# Patient Record
Sex: FEMALE | Race: WHITE | NOT HISPANIC OR LATINO
[De-identification: names, ages, dates, MRNs, and addresses within clinical notes are randomized per-mention and may not be internally consistent; named-entity substitution may affect disease eponyms.]

---

## 2017-04-17 ENCOUNTER — HOSPITAL (OUTPATIENT)
Dept: OTHER | Age: 67
End: 2017-04-17

## 2017-04-25 ENCOUNTER — CHARTING TRANS (OUTPATIENT)
Dept: OTHER | Age: 67
End: 2017-04-25

## 2017-05-01 ENCOUNTER — HOSPITAL (OUTPATIENT)
Dept: OTHER | Age: 67
End: 2017-05-01
Attending: OPHTHALMOLOGY

## 2017-07-10 ENCOUNTER — HOSPITAL (OUTPATIENT)
Dept: OTHER | Age: 67
End: 2017-07-10
Attending: OPHTHALMOLOGY

## 2018-04-12 ENCOUNTER — HOSPITAL (OUTPATIENT)
Dept: OTHER | Age: 68
End: 2018-04-12
Attending: ORTHOPAEDIC SURGERY

## 2018-06-20 PROBLEM — J30.9 ALLERGIC RHINITIS: Status: ACTIVE | Noted: 2018-06-20

## 2018-06-20 PROBLEM — T84.012S FAILED TOTAL KNEE, RIGHT, SEQUELA: Status: ACTIVE | Noted: 2018-06-20

## 2018-06-20 PROBLEM — I10 ESSENTIAL HYPERTENSION: Status: ACTIVE | Noted: 2018-06-20

## 2018-06-20 PROBLEM — E78.2 MIXED HYPERLIPIDEMIA: Status: ACTIVE | Noted: 2018-06-20

## 2018-09-17 ENCOUNTER — HOSPITAL (OUTPATIENT)
Dept: OTHER | Age: 68
End: 2018-09-17
Attending: ORTHOPAEDIC SURGERY

## 2018-09-17 LAB
ANION GAP SERPL CALC-SCNC: 12 MEQ/L (ref 10–20)
BUN SERPL-MCNC: 12 MG/DL (ref 6–20)
CALCIUM: 10 MG/DL (ref 8.4–10.2)
CHLORIDE: 98 MEQ/L (ref 97–107)
CREATININE: 0.67 MG/DL (ref 0.6–1.3)
GLUCOSE LVL: 105 MG/DL (ref 70–99)
HEMOLYSIS 2+: NEGATIVE
POTASSIUM: 4.1 MEQ/L (ref 3.5–5.1)
SODIUM: 134 MEQ/L (ref 136–145)
TCO2: 28 MEQ/L (ref 19–29)

## 2018-11-03 VITALS
HEART RATE: 85 BPM | SYSTOLIC BLOOD PRESSURE: 133 MMHG | HEIGHT: 64 IN | BODY MASS INDEX: 30.73 KG/M2 | WEIGHT: 179.99 LBS | OXYGEN SATURATION: 97 % | DIASTOLIC BLOOD PRESSURE: 68 MMHG

## 2019-03-22 ENCOUNTER — TELEPHONE (OUTPATIENT)
Dept: SURGERY | Age: 69
End: 2019-03-22

## 2019-03-22 DIAGNOSIS — D05.11 DUCTAL CARCINOMA IN SITU (DCIS) OF RIGHT BREAST: Primary | ICD-10-CM

## 2019-03-28 ENCOUNTER — HOSPITAL (OUTPATIENT)
Dept: OTHER | Age: 69
End: 2019-03-28
Attending: SURGERY

## 2019-04-23 DIAGNOSIS — D05.11 DUCTAL CARCINOMA IN SITU (DCIS) OF RIGHT BREAST: ICD-10-CM

## 2019-04-24 ENCOUNTER — OFFICE VISIT (OUTPATIENT)
Dept: SURGERY | Age: 69
End: 2019-04-24

## 2019-04-24 VITALS
BODY MASS INDEX: 31.65 KG/M2 | DIASTOLIC BLOOD PRESSURE: 80 MMHG | SYSTOLIC BLOOD PRESSURE: 167 MMHG | HEART RATE: 83 BPM | HEIGHT: 62 IN | WEIGHT: 172 LBS

## 2019-04-24 DIAGNOSIS — D05.11 DUCTAL CARCINOMA IN SITU (DCIS) OF RIGHT BREAST: Primary | ICD-10-CM

## 2019-04-24 PROCEDURE — 99203 OFFICE O/P NEW LOW 30 MIN: CPT | Performed by: SURGERY

## 2019-04-24 RX ORDER — MULTIVITAMIN
TABLET ORAL
COMMUNITY

## 2019-04-24 RX ORDER — LORATADINE 10 MG/1
10 TABLET ORAL DAILY
COMMUNITY

## 2019-04-24 RX ORDER — VALSARTAN 80 MG/1
TABLET ORAL
COMMUNITY

## 2019-04-24 RX ORDER — OMEGA-3 FATTY ACIDS/FISH OIL 300-1000MG
CAPSULE ORAL
COMMUNITY

## 2019-04-24 RX ORDER — ESOMEPRAZOLE MAGNESIUM 40 MG/1
CAPSULE, DELAYED RELEASE ORAL
COMMUNITY
Start: 2016-05-05

## 2019-04-24 RX ORDER — OMEGA-3/DHA/EPA/FISH OIL 60 MG-90MG
1 CAPSULE ORAL DAILY
COMMUNITY

## 2019-04-24 RX ORDER — RIBOFLAVIN (VITAMIN B2) 100 MG
TABLET ORAL
COMMUNITY

## 2019-04-24 RX ORDER — MULTIVIT-MIN/IRON/FOLIC ACID/K 18-600-40
CAPSULE ORAL
COMMUNITY

## 2019-04-24 RX ORDER — EZETIMIBE 10 MG/1
TABLET ORAL
COMMUNITY
Start: 2016-01-03

## 2019-04-24 SDOH — HEALTH STABILITY: MENTAL HEALTH: HOW OFTEN DO YOU HAVE A DRINK CONTAINING ALCOHOL?: NEVER

## 2019-10-30 ENCOUNTER — HOSPITAL (OUTPATIENT)
Dept: OTHER | Age: 69
End: 2019-10-30
Attending: INTERNAL MEDICINE

## 2019-11-02 ENCOUNTER — HOSPITAL (OUTPATIENT)
Dept: OTHER | Age: 69
End: 2019-11-02
Attending: INTERNAL MEDICINE

## 2019-11-02 LAB
CONFIRM: NORMAL
MALAR REVIEW BY: NORMAL
MALARIA PERCENT SEEN: 0 %
MALARIA SMEAR: NORMAL

## 2019-11-09 ENCOUNTER — HOSPITAL (OUTPATIENT)
Dept: OTHER | Age: 69
End: 2019-11-09
Attending: INTERNAL MEDICINE

## 2019-11-26 RX ORDER — VALSARTAN AND HYDROCHLOROTHIAZIDE 160; 12.5 MG/1; MG/1
1 TABLET, FILM COATED ORAL DAILY
COMMUNITY

## 2019-11-26 RX ORDER — EZETIMIBE 10 MG/1
10 TABLET ORAL NIGHTLY
COMMUNITY

## 2019-11-26 RX ORDER — LORATADINE 10 MG/1
10 TABLET ORAL AS NEEDED
COMMUNITY

## 2019-11-26 RX ORDER — ACETAMINOPHEN 325 MG/1
500 TABLET ORAL EVERY 6 HOURS PRN
Status: ON HOLD | COMMUNITY
End: 2019-12-17

## 2019-11-26 RX ORDER — OMEGA-3 FATTY ACIDS/FISH OIL 300-1000MG
400 CAPSULE ORAL DAILY
Status: ON HOLD | COMMUNITY
End: 2019-12-17

## 2019-11-27 ENCOUNTER — OFFICE VISIT (OUTPATIENT)
Dept: FAMILY MEDICINE CLINIC | Facility: CLINIC | Age: 69
End: 2019-11-27
Payer: COMMERCIAL

## 2019-11-27 ENCOUNTER — HOSPITAL ENCOUNTER (OUTPATIENT)
Dept: GENERAL RADIOLOGY | Facility: HOSPITAL | Age: 69
Discharge: HOME | End: 2019-11-27
Attending: FAMILY MEDICINE
Payer: MEDICARE

## 2019-11-27 ENCOUNTER — LAB ENCOUNTER (OUTPATIENT)
Dept: LAB | Facility: HOSPITAL | Age: 69
End: 2019-11-27
Attending: FAMILY MEDICINE
Payer: MEDICARE

## 2019-11-27 ENCOUNTER — TELEPHONE (OUTPATIENT)
Dept: FAMILY MEDICINE CLINIC | Facility: CLINIC | Age: 69
End: 2019-11-27

## 2019-11-27 VITALS
DIASTOLIC BLOOD PRESSURE: 70 MMHG | SYSTOLIC BLOOD PRESSURE: 132 MMHG | RESPIRATION RATE: 16 BRPM | WEIGHT: 180 LBS | BODY MASS INDEX: 33.13 KG/M2 | HEIGHT: 62 IN | HEART RATE: 88 BPM | TEMPERATURE: 98 F | OXYGEN SATURATION: 98 %

## 2019-11-27 DIAGNOSIS — E78.2 MIXED HYPERLIPIDEMIA: ICD-10-CM

## 2019-11-27 DIAGNOSIS — J30.9 ALLERGIC RHINITIS, UNSPECIFIED SEASONALITY, UNSPECIFIED TRIGGER: ICD-10-CM

## 2019-11-27 DIAGNOSIS — I10 ESSENTIAL HYPERTENSION: ICD-10-CM

## 2019-11-27 DIAGNOSIS — Z01.812 PRE-OPERATIVE LABORATORY EXAMINATION: ICD-10-CM

## 2019-11-27 DIAGNOSIS — T84.093D FAILED TOTAL LEFT KNEE REPLACEMENT, SUBSEQUENT ENCOUNTER: ICD-10-CM

## 2019-11-27 DIAGNOSIS — Z01.818 OTHER SPECIFIED PRE-OPERATIVE EXAMINATION: ICD-10-CM

## 2019-11-27 DIAGNOSIS — R70.0 ELEVATED SED RATE: ICD-10-CM

## 2019-11-27 DIAGNOSIS — Z01.818 OTHER SPECIFIED PRE-OPERATIVE EXAMINATION: Primary | ICD-10-CM

## 2019-11-27 DIAGNOSIS — K21.00 GASTROESOPHAGEAL REFLUX DISEASE WITH ESOPHAGITIS: ICD-10-CM

## 2019-11-27 DIAGNOSIS — R50.9 FEBRILE ILLNESS: ICD-10-CM

## 2019-11-27 PROBLEM — T84.093A FAILED TOTAL KNEE, LEFT: Status: ACTIVE | Noted: 2018-06-20

## 2019-11-27 PROBLEM — T84.093A FAILED TOTAL KNEE, LEFT (HCC): Status: ACTIVE | Noted: 2018-06-20

## 2019-11-27 PROCEDURE — 80053 COMPREHEN METABOLIC PANEL: CPT

## 2019-11-27 PROCEDURE — 93010 ELECTROCARDIOGRAM REPORT: CPT | Performed by: FAMILY MEDICINE

## 2019-11-27 PROCEDURE — 87081 CULTURE SCREEN ONLY: CPT

## 2019-11-27 PROCEDURE — 86850 RBC ANTIBODY SCREEN: CPT

## 2019-11-27 PROCEDURE — 85025 COMPLETE CBC W/AUTO DIFF WBC: CPT

## 2019-11-27 PROCEDURE — 36415 COLL VENOUS BLD VENIPUNCTURE: CPT

## 2019-11-27 PROCEDURE — 86900 BLOOD TYPING SEROLOGIC ABO: CPT

## 2019-11-27 PROCEDURE — 93005 ELECTROCARDIOGRAM TRACING: CPT

## 2019-11-27 PROCEDURE — 81015 MICROSCOPIC EXAM OF URINE: CPT

## 2019-11-27 PROCEDURE — 85652 RBC SED RATE AUTOMATED: CPT

## 2019-11-27 PROCEDURE — 86431 RHEUMATOID FACTOR QUANT: CPT | Performed by: FAMILY MEDICINE

## 2019-11-27 PROCEDURE — 71046 X-RAY EXAM CHEST 2 VIEWS: CPT | Performed by: FAMILY MEDICINE

## 2019-11-27 PROCEDURE — 86038 ANTINUCLEAR ANTIBODIES: CPT | Performed by: FAMILY MEDICINE

## 2019-11-27 PROCEDURE — 86140 C-REACTIVE PROTEIN: CPT

## 2019-11-27 PROCEDURE — 86901 BLOOD TYPING SEROLOGIC RH(D): CPT

## 2019-11-27 PROCEDURE — 99215 OFFICE O/P EST HI 40 MIN: CPT | Performed by: FAMILY MEDICINE

## 2019-11-27 NOTE — H&P (VIEW-ONLY)
[facility] PRE-OP CLINIC DEVISONALI    PRE-OP NOTE    HPI:   I have been consulted by Dr. Aj Stephens to see Tamiko Houstonhry 81year old female for a preoperative evaluation and medical clearance. Kaylen Santiago has a long history of worsening pain of her left knee.  Sh Date   • Back problem     sciatica   • Esophageal reflux    • Essential hypertension    • High blood pressure    • High cholesterol    • Hyperlipidemia    • Osteoarthritis    • Visual impairment     reading glasses     Past Surgical History:   Procedure La pain, visual loss, blurred vision, double vision. Ears, Nose, Throat:  Denies congestion, runny nose or sore throat. INTEGUMENTARY:  Denies rashes, itching, skin lesion,   CARDIOVASCULAR:  Denies DVT.  Denies chest pain, palpitations, edema, dyspnea  RESPI hepatosplenomegaly.   BACK: No tenderness, no spasm,   EXTREMITIES:  No edema, no cyanosis, no clubbing,   NEURO:  No focal deficit, speech fluent, normal gait, strength and tone, sensory intact      Lab Results   Component Value Date    WBC 10.0 11/27/2019 No  Peter has an RCRI score that is low risk and is at 0.9% risk for major cardiac event in the perioperative period.      Pt's labs with elevated sed rate and c-rp to be reviewed by Dr. Madi Groves team. Pt otherwise has an acceptable risk of surgery and ma

## 2019-11-27 NOTE — H&P
[facility] PRE-OP CLINIC DEVISONALI    PRE-OP NOTE    HPI:   I have been consulted by Dr. Clarissa Holguin to see Felicia Bo 81year old female for a preoperative evaluation and medical clearance. Nicky Longoria has a long history of worsening pain of her left knee.  Sh Date   • Back problem     sciatica   • Esophageal reflux    • Essential hypertension    • High blood pressure    • High cholesterol    • Hyperlipidemia    • Osteoarthritis    • Visual impairment     reading glasses     Past Surgical History:   Procedure La pain, visual loss, blurred vision, double vision. Ears, Nose, Throat:  Denies congestion, runny nose or sore throat. INTEGUMENTARY:  Denies rashes, itching, skin lesion,   CARDIOVASCULAR:  Denies DVT.  Denies chest pain, palpitations, edema, dyspnea  RESPI hepatosplenomegaly.   BACK: No tenderness, no spasm,   EXTREMITIES:  No edema, no cyanosis, no clubbing,   NEURO:  No focal deficit, speech fluent, normal gait, strength and tone, sensory intact      Lab Results   Component Value Date    WBC 10.0 11/27/2019 No  Peter has an RCRI score that is low risk and is at 0.9% risk for major cardiac event in the perioperative period.      Pt's labs with elevated sed rate and c-rp to be reviewed by Dr. Rhiannon Arevalo team. Pt otherwise has an acceptable risk of surgery and ma

## 2019-11-27 NOTE — TELEPHONE ENCOUNTER
Per patient had Infection Disease work up due to fever for a month in September. Dr Dayna Mccloud [phone] . Fax [phone]    Dr Jose Miller requesting progress notes to make sure she is clear for surgery. Faxed to Dr Edyta Newsome office.     Jed Ponce RN from Dr Dayan Jarrett

## 2019-11-27 NOTE — TELEPHONE ENCOUNTER
Surgery on 12/17/19, left knee revision with Dr. Kirill Hicks @ [facility]    H&P- complete  Labs-PLT(481),Sodium(133),CO2(33)Calcium,Total(10.6)Total Protein(8.4),Globulin(4.9)A/G Ratio(0.7)Sed Rate(82),All other labs-WNL  EKG-WNL  X-ray- abnormal    Was just at [facility]

## 2019-11-29 NOTE — TELEPHONE ENCOUNTER
Left message with Beau Moseley at  Dr Rhiannon Arevalo office in regards to Dr Hortense Goldberg note on the patient's elevated sed rate and CRP . The patient should probably be rechecked by Dr Don Hoff due to her swollen knee and lab results.

## 2019-12-02 NOTE — TELEPHONE ENCOUNTER
Left message for patient letting her know she is clear for surgery per Dr. Christ Blanchard and that Dr. Sancho Cam office will be giving her a call to get her in before surgery to take a look at her knee again and decide what type of surgery they are doing after asp

## 2019-12-02 NOTE — TELEPHONE ENCOUNTER
Dr. Gerri Flores, please review infectious disease notes in Media Tab 12/02/19. OK for surgery? FYI- Notes also faxed to [facility] at Dr. Cherry Sweeney office. She states patient is coming in for an aspiration this week.  Based of that they may change the type of shalini

## 2019-12-02 NOTE — TELEPHONE ENCOUNTER
Derek Hook MD  [facility], LPN   Caller: Unspecified (5 days ago,  6:36 AM)             [facility]. I had already indicated in my note that she had an acceptable risk and could proceed with surgery as planned.  Thanks

## 2019-12-02 NOTE — TELEPHONE ENCOUNTER
Pt will be seen by Dr. Catarina Duane team to check for any signs of joint infection. She has an acceptable risk for surgery and may proceed with surgery as planned.

## 2019-12-17 ENCOUNTER — ANESTHESIA (OUTPATIENT)
Dept: SURGERY | Facility: HOSPITAL | Age: 69
DRG: 468 | End: 2019-12-17
Payer: MEDICARE

## 2019-12-17 ENCOUNTER — HOSPITAL ENCOUNTER (INPATIENT)
Facility: HOSPITAL | Age: 69
LOS: 2 days | Discharge: HOME HEALTH | DRG: 468 | End: 2019-12-19
Attending: ORTHOPAEDIC SURGERY | Admitting: ORTHOPAEDIC SURGERY
Payer: MEDICARE

## 2019-12-17 ENCOUNTER — ANESTHESIA EVENT (OUTPATIENT)
Dept: SURGERY | Facility: HOSPITAL | Age: 69
DRG: 468 | End: 2019-12-17
Payer: MEDICARE

## 2019-12-17 ENCOUNTER — APPOINTMENT (OUTPATIENT)
Dept: GENERAL RADIOLOGY | Facility: HOSPITAL | Age: 69
DRG: 468 | End: 2019-12-17
Attending: PHYSICIAN ASSISTANT
Payer: MEDICARE

## 2019-12-17 DIAGNOSIS — T84.093A FAILED TOTAL KNEE, LEFT (HCC): Primary | ICD-10-CM

## 2019-12-17 PROBLEM — Z96.652 S/P REVISION OF TOTAL KNEE, LEFT: Status: ACTIVE | Noted: 2019-12-17

## 2019-12-17 PROBLEM — Z96.659 S/P REVISION OF TOTAL KNEE: Status: ACTIVE | Noted: 2019-12-17

## 2019-12-17 LAB
ALBUMIN SERPL-MCNC: 3 G/DL (ref 3.4–5)
ALBUMIN/GLOB SERPL: 0.8 {RATIO} (ref 1–2)
ALP LIVER SERPL-CCNC: 123 U/L (ref 55–142)
ALT SERPL-CCNC: 21 U/L (ref 13–56)
ANION GAP SERPL CALC-SCNC: 9 MMOL/L (ref 0–18)
AST SERPL-CCNC: 19 U/L (ref 15–37)
BASOPHILS NFR FLD: 0 %
BILIRUB SERPL-MCNC: 0.3 MG/DL (ref 0.1–2)
BUN BLD-MCNC: 12 MG/DL (ref 7–18)
BUN/CREAT SERPL: 13.6 (ref 10–20)
CALCIUM BLD-MCNC: 8.7 MG/DL (ref 8.5–10.1)
CHLORIDE SERPL-SCNC: 97 MMOL/L (ref 98–112)
CO2 SERPL-SCNC: 26 MMOL/L (ref 21–32)
COLOR FLD: YELLOW
CREAT BLD-MCNC: 0.88 MG/DL (ref 0.55–1.02)
EOSINOPHIL NFR FLD: 0 %
GLOBULIN PLAS-MCNC: 3.7 G/DL (ref 2.8–4.4)
GLUCOSE BLD-MCNC: 246 MG/DL (ref 70–99)
HCT VFR BLD AUTO: 33.4 % (ref 35–48)
HGB BLD-MCNC: 10.6 G/DL (ref 12–16)
LYMPHOCYTES NFR FLD: 37 %
M PROTEIN MFR SERPL ELPH: 6.7 G/DL (ref 6.4–8.2)
MONOCYTES NFR FLD: 44 %
NEUTROPHILS NFR FLD: 19 %
OSMOLALITY SERPL CALC.SUM OF ELEC: 282 MOSM/KG (ref 275–295)
POTASSIUM SERPL-SCNC: 3.9 MMOL/L (ref 3.5–5.1)
RBC # FLD: 4033 /CUMM (ref ?–1)
SODIUM SERPL-SCNC: 132 MMOL/L (ref 136–145)
WBC # FLD: 143 /CUMM

## 2019-12-17 PROCEDURE — 0SRD0J9 REPLACEMENT OF LEFT KNEE JOINT WITH SYNTHETIC SUBSTITUTE, CEMENTED, OPEN APPROACH: ICD-10-PCS | Performed by: ORTHOPAEDIC SURGERY

## 2019-12-17 PROCEDURE — 89051 BODY FLUID CELL COUNT: CPT | Performed by: ORTHOPAEDIC SURGERY

## 2019-12-17 PROCEDURE — 88300 SURGICAL PATH GROSS: CPT | Performed by: ORTHOPAEDIC SURGERY

## 2019-12-17 PROCEDURE — 73560 X-RAY EXAM OF KNEE 1 OR 2: CPT | Performed by: PHYSICIAN ASSISTANT

## 2019-12-17 PROCEDURE — 85018 HEMOGLOBIN: CPT | Performed by: PHYSICIAN ASSISTANT

## 2019-12-17 PROCEDURE — 88112 CYTOPATH CELL ENHANCE TECH: CPT | Performed by: ORTHOPAEDIC SURGERY

## 2019-12-17 PROCEDURE — 87075 CULTR BACTERIA EXCEPT BLOOD: CPT | Performed by: ORTHOPAEDIC SURGERY

## 2019-12-17 PROCEDURE — 87070 CULTURE OTHR SPECIMN AEROBIC: CPT | Performed by: ORTHOPAEDIC SURGERY

## 2019-12-17 PROCEDURE — 89050 BODY FLUID CELL COUNT: CPT | Performed by: ORTHOPAEDIC SURGERY

## 2019-12-17 PROCEDURE — 87205 SMEAR GRAM STAIN: CPT | Performed by: ORTHOPAEDIC SURGERY

## 2019-12-17 PROCEDURE — 85014 HEMATOCRIT: CPT | Performed by: PHYSICIAN ASSISTANT

## 2019-12-17 PROCEDURE — 0S9D3ZX DRAINAGE OF LEFT KNEE JOINT, PERCUTANEOUS APPROACH, DIAGNOSTIC: ICD-10-PCS | Performed by: ORTHOPAEDIC SURGERY

## 2019-12-17 PROCEDURE — 80053 COMPREHEN METABOLIC PANEL: CPT | Performed by: PHYSICIAN ASSISTANT

## 2019-12-17 PROCEDURE — 88331 PATH CONSLTJ SURG 1 BLK 1SPC: CPT | Performed by: ORTHOPAEDIC SURGERY

## 2019-12-17 PROCEDURE — 88305 TISSUE EXAM BY PATHOLOGIST: CPT | Performed by: ORTHOPAEDIC SURGERY

## 2019-12-17 PROCEDURE — 88332 PATH CONSLTJ SURG EA ADD BLK: CPT | Performed by: ORTHOPAEDIC SURGERY

## 2019-12-17 PROCEDURE — 0SPD0JZ REMOVAL OF SYNTHETIC SUBSTITUTE FROM LEFT KNEE JOINT, OPEN APPROACH: ICD-10-PCS | Performed by: ORTHOPAEDIC SURGERY

## 2019-12-17 DEVICE — IMPLANTABLE DEVICE: Type: IMPLANTABLE DEVICE | Site: KNEE | Status: FUNCTIONAL

## 2019-12-17 DEVICE — KIT FEM BONE CEMENT RESTRICTOR: Type: IMPLANTABLE DEVICE | Site: KNEE | Status: FUNCTIONAL

## 2019-12-17 DEVICE — CEM BN NLTX STRL REUSE MED VSC: Type: IMPLANTABLE DEVICE | Site: KNEE | Status: FUNCTIONAL

## 2019-12-17 RX ORDER — METOCLOPRAMIDE 10 MG/1
10 TABLET ORAL ONCE
Status: COMPLETED | OUTPATIENT
Start: 2019-12-17 | End: 2019-12-17

## 2019-12-17 RX ORDER — ASPIRIN 325 MG
325 TABLET, DELAYED RELEASE (ENTERIC COATED) ORAL 2 TIMES DAILY WITH MEALS
Status: DISCONTINUED | OUTPATIENT
Start: 2019-12-17 | End: 2019-12-19

## 2019-12-17 RX ORDER — FAMOTIDINE 20 MG/1
20 TABLET ORAL ONCE
Status: COMPLETED | OUTPATIENT
Start: 2019-12-17 | End: 2019-12-17

## 2019-12-17 RX ORDER — TRAMADOL HYDROCHLORIDE 50 MG/1
50 TABLET ORAL EVERY 6 HOURS PRN
Status: DISCONTINUED | OUTPATIENT
Start: 2019-12-17 | End: 2019-12-19

## 2019-12-17 RX ORDER — HYDROMORPHONE HYDROCHLORIDE 1 MG/ML
0.2 INJECTION, SOLUTION INTRAMUSCULAR; INTRAVENOUS; SUBCUTANEOUS EVERY 5 MIN PRN
Status: DISCONTINUED | OUTPATIENT
Start: 2019-12-17 | End: 2019-12-17 | Stop reason: HOSPADM

## 2019-12-17 RX ORDER — OXYCODONE HCL 10 MG/1
10 TABLET, FILM COATED, EXTENDED RELEASE ORAL ONCE
Status: COMPLETED | OUTPATIENT
Start: 2019-12-17 | End: 2019-12-17

## 2019-12-17 RX ORDER — FAMOTIDINE 20 MG/1
20 TABLET ORAL 2 TIMES DAILY
Status: DISCONTINUED | OUTPATIENT
Start: 2019-12-17 | End: 2019-12-19

## 2019-12-17 RX ORDER — SODIUM CHLORIDE, SODIUM LACTATE, POTASSIUM CHLORIDE, CALCIUM CHLORIDE 600; 310; 30; 20 MG/100ML; MG/100ML; MG/100ML; MG/100ML
INJECTION, SOLUTION INTRAVENOUS CONTINUOUS
Status: DISCONTINUED | OUTPATIENT
Start: 2019-12-17 | End: 2019-12-19

## 2019-12-17 RX ORDER — LIDOCAINE HYDROCHLORIDE 10 MG/ML
INJECTION, SOLUTION EPIDURAL; INFILTRATION; INTRACAUDAL; PERINEURAL AS NEEDED
Status: DISCONTINUED | OUTPATIENT
Start: 2019-12-17 | End: 2019-12-17 | Stop reason: SURG

## 2019-12-17 RX ORDER — SODIUM CHLORIDE, SODIUM LACTATE, POTASSIUM CHLORIDE, CALCIUM CHLORIDE 600; 310; 30; 20 MG/100ML; MG/100ML; MG/100ML; MG/100ML
INJECTION, SOLUTION INTRAVENOUS CONTINUOUS
Status: DISCONTINUED | OUTPATIENT
Start: 2019-12-17 | End: 2019-12-17 | Stop reason: HOSPADM

## 2019-12-17 RX ORDER — ONDANSETRON 2 MG/ML
4 INJECTION INTRAMUSCULAR; INTRAVENOUS EVERY 4 HOURS PRN
Status: DISCONTINUED | OUTPATIENT
Start: 2019-12-17 | End: 2019-12-19

## 2019-12-17 RX ORDER — PHENYLEPHRINE HCL 10 MG/ML
VIAL (ML) INJECTION AS NEEDED
Status: DISCONTINUED | OUTPATIENT
Start: 2019-12-17 | End: 2019-12-17 | Stop reason: SURG

## 2019-12-17 RX ORDER — HYDROMORPHONE HYDROCHLORIDE 1 MG/ML
1.2 INJECTION, SOLUTION INTRAMUSCULAR; INTRAVENOUS; SUBCUTANEOUS EVERY 2 HOUR PRN
Status: DISCONTINUED | OUTPATIENT
Start: 2019-12-17 | End: 2019-12-19

## 2019-12-17 RX ORDER — MORPHINE SULFATE 4 MG/ML
4 INJECTION, SOLUTION INTRAMUSCULAR; INTRAVENOUS EVERY 10 MIN PRN
Status: DISCONTINUED | OUTPATIENT
Start: 2019-12-17 | End: 2019-12-17 | Stop reason: HOSPADM

## 2019-12-17 RX ORDER — OXYCODONE HYDROCHLORIDE 5 MG/1
10 TABLET ORAL EVERY 4 HOURS PRN
Status: DISCONTINUED | OUTPATIENT
Start: 2019-12-17 | End: 2019-12-19

## 2019-12-17 RX ORDER — IBUPROFEN 600 MG/1
600 TABLET ORAL EVERY 8 HOURS PRN
Qty: 90 TABLET | Refills: 2 | Status: SHIPPED | OUTPATIENT
Start: 2019-12-17

## 2019-12-17 RX ORDER — DOCUSATE SODIUM 100 MG/1
100 CAPSULE, LIQUID FILLED ORAL 2 TIMES DAILY
Status: DISCONTINUED | OUTPATIENT
Start: 2019-12-17 | End: 2019-12-19

## 2019-12-17 RX ORDER — ACETAMINOPHEN 500 MG
TABLET ORAL EVERY 6 HOURS PRN
Qty: 90 TABLET | Refills: 0 | Status: SHIPPED | OUTPATIENT
Start: 2019-12-17

## 2019-12-17 RX ORDER — ONDANSETRON 4 MG/1
4 TABLET, FILM COATED ORAL EVERY 8 HOURS PRN
Qty: 30 TABLET | Refills: 0 | Status: SHIPPED | OUTPATIENT
Start: 2019-12-17

## 2019-12-17 RX ORDER — POLYETHYLENE GLYCOL 3350 17 G/17G
17 POWDER, FOR SOLUTION ORAL DAILY PRN
Status: DISCONTINUED | OUTPATIENT
Start: 2019-12-17 | End: 2019-12-19

## 2019-12-17 RX ORDER — BISACODYL 10 MG
10 SUPPOSITORY, RECTAL RECTAL
Status: DISCONTINUED | OUTPATIENT
Start: 2019-12-17 | End: 2019-12-19

## 2019-12-17 RX ORDER — IBUPROFEN 600 MG/1
600 TABLET ORAL EVERY 8 HOURS SCHEDULED
Status: DISCONTINUED | OUTPATIENT
Start: 2019-12-18 | End: 2019-12-19

## 2019-12-17 RX ORDER — TRANEXAMIC ACID 10 MG/ML
1000 INJECTION, SOLUTION INTRAVENOUS ONCE
Status: COMPLETED | OUTPATIENT
Start: 2019-12-17 | End: 2019-12-17

## 2019-12-17 RX ORDER — HYDROMORPHONE HYDROCHLORIDE 1 MG/ML
0.6 INJECTION, SOLUTION INTRAMUSCULAR; INTRAVENOUS; SUBCUTANEOUS EVERY 5 MIN PRN
Status: DISCONTINUED | OUTPATIENT
Start: 2019-12-17 | End: 2019-12-17 | Stop reason: HOSPADM

## 2019-12-17 RX ORDER — PROCHLORPERAZINE EDISYLATE 5 MG/ML
10 INJECTION INTRAMUSCULAR; INTRAVENOUS EVERY 6 HOURS PRN
Status: DISCONTINUED | OUTPATIENT
Start: 2019-12-17 | End: 2019-12-19

## 2019-12-17 RX ORDER — MORPHINE SULFATE 10 MG/ML
6 INJECTION, SOLUTION INTRAMUSCULAR; INTRAVENOUS EVERY 10 MIN PRN
Status: DISCONTINUED | OUTPATIENT
Start: 2019-12-17 | End: 2019-12-17 | Stop reason: HOSPADM

## 2019-12-17 RX ORDER — OXYCODONE HYDROCHLORIDE 5 MG/1
5 TABLET ORAL EVERY 4 HOURS PRN
Status: DISCONTINUED | OUTPATIENT
Start: 2019-12-17 | End: 2019-12-19

## 2019-12-17 RX ORDER — ASPIRIN 325 MG
325 TABLET, DELAYED RELEASE (ENTERIC COATED) ORAL 2 TIMES DAILY
Qty: 60 TABLET | Refills: 0 | Status: SHIPPED | OUTPATIENT
Start: 2019-12-17

## 2019-12-17 RX ORDER — DOCUSATE SODIUM 100 MG/1
100 CAPSULE, LIQUID FILLED ORAL 2 TIMES DAILY
Qty: 60 CAPSULE | Refills: 2 | Status: SHIPPED | OUTPATIENT
Start: 2019-12-17

## 2019-12-17 RX ORDER — METOCLOPRAMIDE HYDROCHLORIDE 5 MG/ML
10 INJECTION INTRAMUSCULAR; INTRAVENOUS EVERY 6 HOURS PRN
Status: DISCONTINUED | OUTPATIENT
Start: 2019-12-17 | End: 2019-12-19

## 2019-12-17 RX ORDER — HYDROMORPHONE HYDROCHLORIDE 1 MG/ML
0.8 INJECTION, SOLUTION INTRAMUSCULAR; INTRAVENOUS; SUBCUTANEOUS EVERY 2 HOUR PRN
Status: DISCONTINUED | OUTPATIENT
Start: 2019-12-17 | End: 2019-12-19

## 2019-12-17 RX ORDER — KETOROLAC TROMETHAMINE 30 MG/ML
30 INJECTION, SOLUTION INTRAMUSCULAR; INTRAVENOUS EVERY 8 HOURS SCHEDULED
Status: COMPLETED | OUTPATIENT
Start: 2019-12-17 | End: 2019-12-18

## 2019-12-17 RX ORDER — HYDROMORPHONE HYDROCHLORIDE 1 MG/ML
0.4 INJECTION, SOLUTION INTRAMUSCULAR; INTRAVENOUS; SUBCUTANEOUS EVERY 2 HOUR PRN
Status: DISCONTINUED | OUTPATIENT
Start: 2019-12-17 | End: 2019-12-19

## 2019-12-17 RX ORDER — HYDROCODONE BITARTRATE AND ACETAMINOPHEN 5; 325 MG/1; MG/1
2 TABLET ORAL AS NEEDED
Status: DISCONTINUED | OUTPATIENT
Start: 2019-12-17 | End: 2019-12-17 | Stop reason: HOSPADM

## 2019-12-17 RX ORDER — DIPHENHYDRAMINE HYDROCHLORIDE 50 MG/ML
12.5 INJECTION INTRAMUSCULAR; INTRAVENOUS EVERY 4 HOURS PRN
Status: DISCONTINUED | OUTPATIENT
Start: 2019-12-17 | End: 2019-12-19

## 2019-12-17 RX ORDER — DEXAMETHASONE SODIUM PHOSPHATE 4 MG/ML
VIAL (ML) INJECTION AS NEEDED
Status: DISCONTINUED | OUTPATIENT
Start: 2019-12-17 | End: 2019-12-17 | Stop reason: SURG

## 2019-12-17 RX ORDER — PROCHLORPERAZINE EDISYLATE 5 MG/ML
5 INJECTION INTRAMUSCULAR; INTRAVENOUS ONCE AS NEEDED
Status: DISCONTINUED | OUTPATIENT
Start: 2019-12-17 | End: 2019-12-17 | Stop reason: HOSPADM

## 2019-12-17 RX ORDER — ACETAMINOPHEN 500 MG
1000 TABLET ORAL ONCE
Status: COMPLETED | OUTPATIENT
Start: 2019-12-17 | End: 2019-12-17

## 2019-12-17 RX ORDER — MIDAZOLAM HYDROCHLORIDE 1 MG/ML
INJECTION INTRAMUSCULAR; INTRAVENOUS AS NEEDED
Status: DISCONTINUED | OUTPATIENT
Start: 2019-12-17 | End: 2019-12-17 | Stop reason: SURG

## 2019-12-17 RX ORDER — MORPHINE SULFATE 4 MG/ML
2 INJECTION, SOLUTION INTRAMUSCULAR; INTRAVENOUS EVERY 10 MIN PRN
Status: DISCONTINUED | OUTPATIENT
Start: 2019-12-17 | End: 2019-12-17 | Stop reason: HOSPADM

## 2019-12-17 RX ORDER — ACETAMINOPHEN 325 MG/1
650 TABLET ORAL EVERY 6 HOURS SCHEDULED
Status: DISCONTINUED | OUTPATIENT
Start: 2019-12-18 | End: 2019-12-19

## 2019-12-17 RX ORDER — TRAMADOL HYDROCHLORIDE 50 MG/1
TABLET ORAL EVERY 4 HOURS PRN
Qty: 60 TABLET | Refills: 0 | Status: SHIPPED | OUTPATIENT
Start: 2019-12-17

## 2019-12-17 RX ORDER — HYDROMORPHONE HYDROCHLORIDE 1 MG/ML
0.4 INJECTION, SOLUTION INTRAMUSCULAR; INTRAVENOUS; SUBCUTANEOUS EVERY 5 MIN PRN
Status: DISCONTINUED | OUTPATIENT
Start: 2019-12-17 | End: 2019-12-17 | Stop reason: HOSPADM

## 2019-12-17 RX ORDER — FAMOTIDINE 10 MG/ML
20 INJECTION, SOLUTION INTRAVENOUS 2 TIMES DAILY
Status: DISCONTINUED | OUTPATIENT
Start: 2019-12-17 | End: 2019-12-19

## 2019-12-17 RX ORDER — CEFAZOLIN SODIUM/WATER 2 G/20 ML
2 SYRINGE (ML) INTRAVENOUS EVERY 8 HOURS
Status: COMPLETED | OUTPATIENT
Start: 2019-12-17 | End: 2019-12-18

## 2019-12-17 RX ORDER — CETIRIZINE HYDROCHLORIDE 5 MG/1
10 TABLET ORAL
Status: DISCONTINUED | OUTPATIENT
Start: 2019-12-17 | End: 2019-12-19

## 2019-12-17 RX ORDER — DIPHENHYDRAMINE HYDROCHLORIDE 50 MG/ML
25 INJECTION INTRAMUSCULAR; INTRAVENOUS ONCE AS NEEDED
Status: ACTIVE | OUTPATIENT
Start: 2019-12-17 | End: 2019-12-17

## 2019-12-17 RX ORDER — GABAPENTIN 100 MG/1
100 CAPSULE ORAL 3 TIMES DAILY
Status: DISCONTINUED | OUTPATIENT
Start: 2019-12-17 | End: 2019-12-19

## 2019-12-17 RX ORDER — CYCLOBENZAPRINE HCL 5 MG
5 TABLET ORAL EVERY 8 HOURS PRN
Status: DISCONTINUED | OUTPATIENT
Start: 2019-12-17 | End: 2019-12-19

## 2019-12-17 RX ORDER — CELECOXIB 200 MG/1
200 CAPSULE ORAL ONCE
Status: DISCONTINUED | OUTPATIENT
Start: 2019-12-17 | End: 2019-12-17 | Stop reason: HOSPADM

## 2019-12-17 RX ORDER — ONDANSETRON 2 MG/ML
INJECTION INTRAMUSCULAR; INTRAVENOUS AS NEEDED
Status: DISCONTINUED | OUTPATIENT
Start: 2019-12-17 | End: 2019-12-17 | Stop reason: SURG

## 2019-12-17 RX ORDER — EPHEDRINE SULFATE 50 MG/ML
INJECTION, SOLUTION INTRAVENOUS AS NEEDED
Status: DISCONTINUED | OUTPATIENT
Start: 2019-12-17 | End: 2019-12-17 | Stop reason: SURG

## 2019-12-17 RX ORDER — OXYCODONE HYDROCHLORIDE 5 MG/1
TABLET ORAL EVERY 4 HOURS PRN
Qty: 60 TABLET | Refills: 0 | Status: SHIPPED | OUTPATIENT
Start: 2019-12-17

## 2019-12-17 RX ORDER — BUPIVACAINE HYDROCHLORIDE 7.5 MG/ML
INJECTION, SOLUTION EPIDURAL; RETROBULBAR AS NEEDED
Status: DISCONTINUED | OUTPATIENT
Start: 2019-12-17 | End: 2019-12-17 | Stop reason: SURG

## 2019-12-17 RX ORDER — SODIUM PHOSPHATE, DIBASIC AND SODIUM PHOSPHATE, MONOBASIC 7; 19 G/133ML; G/133ML
1 ENEMA RECTAL ONCE AS NEEDED
Status: DISCONTINUED | OUTPATIENT
Start: 2019-12-17 | End: 2019-12-19

## 2019-12-17 RX ORDER — VALSARTAN AND HYDROCHLOROTHIAZIDE 160; 12.5 MG/1; MG/1
1 TABLET, FILM COATED ORAL DAILY
Status: DISCONTINUED | OUTPATIENT
Start: 2019-12-17 | End: 2019-12-17

## 2019-12-17 RX ORDER — GABAPENTIN 100 MG/1
100 CAPSULE ORAL 3 TIMES DAILY
Qty: 90 CAPSULE | Refills: 0 | Status: SHIPPED | OUTPATIENT
Start: 2019-12-17

## 2019-12-17 RX ORDER — SCOLOPAMINE TRANSDERMAL SYSTEM 1 MG/1
1 PATCH, EXTENDED RELEASE TRANSDERMAL ONCE
Status: DISCONTINUED | OUTPATIENT
Start: 2019-12-17 | End: 2019-12-19

## 2019-12-17 RX ORDER — SODIUM CHLORIDE 0.9 % (FLUSH) 0.9 %
10 SYRINGE (ML) INJECTION AS NEEDED
Status: DISCONTINUED | OUTPATIENT
Start: 2019-12-17 | End: 2019-12-19

## 2019-12-17 RX ORDER — HALOPERIDOL 5 MG/ML
0.25 INJECTION INTRAMUSCULAR ONCE AS NEEDED
Status: DISCONTINUED | OUTPATIENT
Start: 2019-12-17 | End: 2019-12-17 | Stop reason: HOSPADM

## 2019-12-17 RX ORDER — SENNOSIDES 8.6 MG
17.2 TABLET ORAL NIGHTLY
Status: DISCONTINUED | OUTPATIENT
Start: 2019-12-17 | End: 2019-12-19

## 2019-12-17 RX ORDER — HYDROCODONE BITARTRATE AND ACETAMINOPHEN 5; 325 MG/1; MG/1
1 TABLET ORAL AS NEEDED
Status: DISCONTINUED | OUTPATIENT
Start: 2019-12-17 | End: 2019-12-17 | Stop reason: HOSPADM

## 2019-12-17 RX ORDER — ONDANSETRON 2 MG/ML
4 INJECTION INTRAMUSCULAR; INTRAVENOUS ONCE AS NEEDED
Status: DISCONTINUED | OUTPATIENT
Start: 2019-12-17 | End: 2019-12-17 | Stop reason: HOSPADM

## 2019-12-17 RX ORDER — DIPHENHYDRAMINE HCL 25 MG
25 CAPSULE ORAL EVERY 4 HOURS PRN
Status: DISCONTINUED | OUTPATIENT
Start: 2019-12-17 | End: 2019-12-19

## 2019-12-17 RX ORDER — CEFAZOLIN SODIUM/WATER 2 G/20 ML
2 SYRINGE (ML) INTRAVENOUS ONCE
Status: COMPLETED | OUTPATIENT
Start: 2019-12-17 | End: 2019-12-17

## 2019-12-17 RX ORDER — NALOXONE HYDROCHLORIDE 0.4 MG/ML
80 INJECTION, SOLUTION INTRAMUSCULAR; INTRAVENOUS; SUBCUTANEOUS AS NEEDED
Status: DISCONTINUED | OUTPATIENT
Start: 2019-12-17 | End: 2019-12-17 | Stop reason: HOSPADM

## 2019-12-17 RX ORDER — PANTOPRAZOLE SODIUM 40 MG/1
40 TABLET, DELAYED RELEASE ORAL
Qty: 30 TABLET | Refills: 0 | Status: SHIPPED | OUTPATIENT
Start: 2019-12-17

## 2019-12-17 RX ORDER — LORATADINE 10 MG/1
10 TABLET ORAL AS NEEDED
Status: DISCONTINUED | OUTPATIENT
Start: 2019-12-17 | End: 2019-12-17

## 2019-12-17 RX ORDER — TRAMADOL HYDROCHLORIDE 50 MG/1
100 TABLET ORAL EVERY 6 HOURS PRN
Status: DISCONTINUED | OUTPATIENT
Start: 2019-12-17 | End: 2019-12-19

## 2019-12-17 RX ORDER — ASPIRIN 325 MG
325 TABLET ORAL 2 TIMES DAILY
Status: DISCONTINUED | OUTPATIENT
Start: 2019-12-17 | End: 2019-12-17

## 2019-12-17 RX ORDER — EZETIMIBE 10 MG/1
10 TABLET ORAL NIGHTLY
Status: DISCONTINUED | OUTPATIENT
Start: 2019-12-17 | End: 2019-12-19

## 2019-12-17 RX ADMIN — EPHEDRINE SULFATE 5 MG: 50 INJECTION, SOLUTION INTRAVENOUS at 16:00:00

## 2019-12-17 RX ADMIN — PHENYLEPHRINE HCL 40 MCG: 10 MG/ML VIAL (ML) INJECTION at 16:00:00

## 2019-12-17 RX ADMIN — EPHEDRINE SULFATE 5 MG: 50 INJECTION, SOLUTION INTRAVENOUS at 15:43:00

## 2019-12-17 RX ADMIN — LIDOCAINE HYDROCHLORIDE 10 MG: 10 INJECTION, SOLUTION EPIDURAL; INFILTRATION; INTRACAUDAL; PERINEURAL at 15:02:00

## 2019-12-17 RX ADMIN — SODIUM CHLORIDE, SODIUM LACTATE, POTASSIUM CHLORIDE, CALCIUM CHLORIDE: 600; 310; 30; 20 INJECTION, SOLUTION INTRAVENOUS at 17:41:00

## 2019-12-17 RX ADMIN — PHENYLEPHRINE HCL 80 MCG: 10 MG/ML VIAL (ML) INJECTION at 15:40:00

## 2019-12-17 RX ADMIN — MIDAZOLAM HYDROCHLORIDE 1 MG: 1 INJECTION INTRAMUSCULAR; INTRAVENOUS at 15:02:00

## 2019-12-17 RX ADMIN — PHENYLEPHRINE HCL 40 MCG: 10 MG/ML VIAL (ML) INJECTION at 15:14:00

## 2019-12-17 RX ADMIN — MIDAZOLAM HYDROCHLORIDE 1 MG: 1 INJECTION INTRAMUSCULAR; INTRAVENOUS at 14:53:00

## 2019-12-17 RX ADMIN — SODIUM CHLORIDE, SODIUM LACTATE, POTASSIUM CHLORIDE, CALCIUM CHLORIDE: 600; 310; 30; 20 INJECTION, SOLUTION INTRAVENOUS at 16:40:00

## 2019-12-17 RX ADMIN — CEFAZOLIN SODIUM/WATER 2 G: 2 G/20 ML SYRINGE (ML) INTRAVENOUS at 15:05:00

## 2019-12-17 RX ADMIN — BUPIVACAINE HYDROCHLORIDE 1.6 ML: 7.5 INJECTION, SOLUTION EPIDURAL; RETROBULBAR at 15:05:00

## 2019-12-17 RX ADMIN — PHENYLEPHRINE HCL 80 MCG: 10 MG/ML VIAL (ML) INJECTION at 15:30:00

## 2019-12-17 RX ADMIN — DEXAMETHASONE SODIUM PHOSPHATE 4 MG: 4 MG/ML VIAL (ML) INJECTION at 15:30:00

## 2019-12-17 RX ADMIN — TRANEXAMIC ACID 1000 MG: 10 INJECTION, SOLUTION INTRAVENOUS at 15:15:00

## 2019-12-17 RX ADMIN — ONDANSETRON 4 MG: 2 INJECTION INTRAMUSCULAR; INTRAVENOUS at 15:30:00

## 2019-12-17 RX ADMIN — PHENYLEPHRINE HCL 80 MCG: 10 MG/ML VIAL (ML) INJECTION at 15:23:00

## 2019-12-17 NOTE — ANESTHESIA POSTPROCEDURE EVALUATION
Patient: Pippa Vallejo    Procedure Summary     Date:  12/17/19 Room / Location:  [address] [place] [address] MAIN OR 09 / [address] [facility] OR    Anesthesia Start:  8855 Anesthesia Stop:  1480    Procedure:  KNEE TOTAL REVISION (Left ) Diagnosis:  (mechanical loosening of left knee)

## 2019-12-17 NOTE — ANESTHESIA PREPROCEDURE EVALUATION
Anesthesia PreOp Note    HPI:     Renita Grier is a 81year old female who presents for preoperative consultation requested by: Sherri Grace MD    Date of Surgery: 12/17/2019    Procedure(s):  KNEE TOTAL REVISION  Indication: mechanical loosening o time  loratadine 10 MG Oral Tab, Take 10 mg by mouth as needed for Allergies. , Disp: , Rfl: , Past Month at Unknown time  Multiple Vitamins-Minerals (MULTIVITAMIN ADULTS 50+ OR), Take 1 tablet by mouth daily. , Disp: , Rfl: , 12/10/2019 at Unknown time  [DI Oral Tab, Take 1-2 tablets (5-10 mg total) by mouth every 4 (four) hours as needed for Pain., Disp: 60 tablet, Rfl: 0  Pantoprazole Sodium 40 MG Oral Tab EC, Take 1 tablet (40 mg total) by mouth every morning before breakfast., Disp: 30 tablet, Rfl: 0  tra file      Available pre-op labs reviewed.   Lab Results   Component Value Date    WBC 10.0 11/27/2019    RBC 4.54 11/27/2019    HGB 12.0 11/27/2019    HCT 38.1 11/27/2019    MCV 83.9 11/27/2019    MCH 26.4 11/27/2019    MCHC 31.5 11/27/2019    RDW 12.9 11/2 answered to the best of my ability. The patient desires the anesthetic management as planned.   I have informed Oneita Turbeville of the risks of spinal anesthesia including, but not limited to: failure, headache, backache, difficulty breathing, infection,

## 2019-12-17 NOTE — INTERVAL H&P NOTE
Pre-op Diagnosis: mechanical loosening of left knee    The above referenced H&P was reviewed by LUIS Mallory on 12/17/2019, the patient was examined and no significant changes have occurred in the patient's condition since the H&P was performed.   I d

## 2019-12-17 NOTE — ANESTHESIA PROCEDURE NOTES
Spinal Block  Performed by: Devika Santiago, CRNA  Authorized by: Kelly Johnson MD       General Information and Staff    Start Time:  12/17/2019 3:02 PM  End Time:  12/17/2019 3:05 PM  Anesthesiologist:  Kelly Johnson MD  CRNA:  Wesly Villafuerte

## 2019-12-18 PROBLEM — E11.65 TYPE 2 DIABETES MELLITUS WITH HYPERGLYCEMIA (HCC): Status: ACTIVE | Noted: 2019-12-18

## 2019-12-18 LAB
ALBUMIN SERPL-MCNC: 2.5 G/DL (ref 3.4–5)
ALBUMIN/GLOB SERPL: 0.8 {RATIO} (ref 1–2)
ALP LIVER SERPL-CCNC: 110 U/L (ref 55–142)
ALT SERPL-CCNC: 17 U/L (ref 13–56)
ANION GAP SERPL CALC-SCNC: 7 MMOL/L (ref 0–18)
AST SERPL-CCNC: 17 U/L (ref 15–37)
BILIRUB SERPL-MCNC: 0.2 MG/DL (ref 0.1–2)
BUN BLD-MCNC: 13 MG/DL (ref 7–18)
BUN/CREAT SERPL: 16.7 (ref 10–20)
CALCIUM BLD-MCNC: 8.2 MG/DL (ref 8.5–10.1)
CHLORIDE SERPL-SCNC: 96 MMOL/L (ref 98–112)
CO2 SERPL-SCNC: 27 MMOL/L (ref 21–32)
CREAT BLD-MCNC: 0.78 MG/DL (ref 0.55–1.02)
DEPRECATED RDW RBC AUTO: 40.7 FL (ref 35.1–46.3)
ERYTHROCYTE [DISTWIDTH] IN BLOOD BY AUTOMATED COUNT: 13.4 % (ref 11–15)
EST. AVERAGE GLUCOSE BLD GHB EST-MCNC: 148 MG/DL (ref 68–126)
GLOBULIN PLAS-MCNC: 3.3 G/DL (ref 2.8–4.4)
GLUCOSE BLD-MCNC: 263 MG/DL (ref 70–99)
GLUCOSE BLDC GLUCOMTR-MCNC: 129 MG/DL (ref 70–99)
GLUCOSE BLDC GLUCOMTR-MCNC: 132 MG/DL (ref 70–99)
GLUCOSE BLDC GLUCOMTR-MCNC: 184 MG/DL (ref 70–99)
HBA1C MFR BLD HPLC: 6.8 % (ref ?–5.7)
HCT VFR BLD AUTO: 29.2 % (ref 35–48)
HGB BLD-MCNC: 9.2 G/DL (ref 12–16)
M PROTEIN MFR SERPL ELPH: 5.8 G/DL (ref 6.4–8.2)
MCH RBC QN AUTO: 26 PG (ref 26–34)
MCHC RBC AUTO-ENTMCNC: 31.5 G/DL (ref 31–37)
MCV RBC AUTO: 82.5 FL (ref 80–100)
OSMOLALITY SERPL CALC.SUM OF ELEC: 279 MOSM/KG (ref 275–295)
PLATELET # BLD AUTO: 250 10(3)UL (ref 150–450)
POTASSIUM SERPL-SCNC: 4.2 MMOL/L (ref 3.5–5.1)
RBC # BLD AUTO: 3.54 X10(6)UL (ref 3.8–5.3)
SODIUM SERPL-SCNC: 130 MMOL/L (ref 136–145)
WBC # BLD AUTO: 15.2 X10(3) UL (ref 4–11)

## 2019-12-18 PROCEDURE — 97162 PT EVAL MOD COMPLEX 30 MIN: CPT

## 2019-12-18 PROCEDURE — 85027 COMPLETE CBC AUTOMATED: CPT | Performed by: FAMILY MEDICINE

## 2019-12-18 PROCEDURE — 82962 GLUCOSE BLOOD TEST: CPT

## 2019-12-18 PROCEDURE — 80053 COMPREHEN METABOLIC PANEL: CPT | Performed by: FAMILY MEDICINE

## 2019-12-18 PROCEDURE — 97110 THERAPEUTIC EXERCISES: CPT

## 2019-12-18 PROCEDURE — 83036 HEMOGLOBIN GLYCOSYLATED A1C: CPT | Performed by: FAMILY MEDICINE

## 2019-12-18 PROCEDURE — 97116 GAIT TRAINING THERAPY: CPT

## 2019-12-18 RX ORDER — DEXTROSE MONOHYDRATE 25 G/50ML
50 INJECTION, SOLUTION INTRAVENOUS AS NEEDED
Status: DISCONTINUED | OUTPATIENT
Start: 2019-12-18 | End: 2019-12-19

## 2019-12-18 NOTE — PHYSICAL THERAPY NOTE
PHYSICAL THERAPY KNEE EVALUATION - INPATIENT       Room Number: 424/424-A  Evaluation Date: 12/18/2019  Type of Evaluation: Initial  Physician Order: PT Eval and Treat    Presenting Problem: TKR revision LLE  Reason for Therapy: Mobility Dysfunction and Malick Richey revision required d/t complications with a loose prosthetic. Pt reports she traveled to [facility] for a [place] Holiday and was very active on the knee after her recovery.      Problem List  Principal Problem:    Failed total knee, left (HCC)  Active Problems knee  Management Techniques: Activity promotion; Body mechanics;Breathing techniques;Repositioning;Relaxation    COGNITION  · Overall Cognitive Status:  WFL - within functional limits, anxious, hyper verbal    RANGE OF MOTION AND STRENGTH ASSESSMENT  Upper slides  LAQ  Transfer training    Patient End of Session: Up in chair; With [facility] staff;Needs met;Call light within reach;RN aware of session/findings; All patient questions and concerns addressed;SCDs in place; Family present    CURRENT GOALS    Goals to be met

## 2019-12-18 NOTE — PHYSICAL THERAPY NOTE
PT PM note: Pt presented with improved functional mobility tolerating bed mobility and transfers with SBA with a RW. Pt education provided for gait progression amb 150' with a RW with a step through gait pattern with SBA.  Pt education with therex in chair

## 2019-12-18 NOTE — PROGRESS NOTES
[facility]    Progress Note    Gala Blood Patient Status:  Inpatient    1950 MRN [id number]   Location [facility] 4W/SW/SE Attending Andres Curry MD   Hosp Day # 1 PCP Félix Russo MD       Subjective:   Shirin Palmer 18:56                Assessment and Plan:     Failed total knee, left (HCC)  Now revised      S/P revision of total knee, left  Stable and doing well.  Aspirin for anticoagulation       Gastroesophageal reflux disease with esophagitis        Type 2 diabetes

## 2019-12-18 NOTE — PROGRESS NOTES
[facility]    Progress Note    Stella Hutchinson Patient Status:  Inpatient    1950 MRN [id number]   Location [facility] 4W/SW/SE Attending Diana De La Rosa MD   Hosp Day # 1 PCP Florentino Yeager MD       Subjective:   Samuel Booker PACINDI  Physician Assistant for Dr. Jw Cain: [phone]  F: [phone]  C: [phone]

## 2019-12-18 NOTE — PROGRESS NOTES
[facility]    Progress Note    Clemente Johnston Patient Status:  Inpatient    1950 MRN [id number]   Location [facility] 4W/SW/SE Attending Avi Mora MD   Hosp Day # 0 PCP Dax Isaac MD       Subjective:   Timothy Estrada revision of total knee, left  Stable and doing well.  Aspirin for anticoagulation       Gastroesophageal reflux disease with esophagitis        Essential hypertension        Mixed hyperlipidemia        Allergic rhinitis        S/P revision of total knee

## 2019-12-18 NOTE — FACE TO FACE NOTE
Face to Face Encounter    I (or a non-physician practitioner working in collaboration with me) had a face to face encounter with this patient during which a medical condition was addressed which is the primary reason for home health care on : 12/18/2019

## 2019-12-18 NOTE — CM/SW NOTE
SW received MDO for home health. SW met with pt to complete an initial assessment. SW confirmed pt's address and phone number with the pt. Pt lives in a 3 level  house with spouse. There is/are 3 steps into the home and 14 steps to the bedrooms.  Pt states

## 2019-12-19 VITALS
BODY MASS INDEX: 31.07 KG/M2 | TEMPERATURE: 98 F | OXYGEN SATURATION: 95 % | RESPIRATION RATE: 18 BRPM | HEIGHT: 64 IN | SYSTOLIC BLOOD PRESSURE: 111 MMHG | HEART RATE: 91 BPM | DIASTOLIC BLOOD PRESSURE: 48 MMHG | WEIGHT: 182 LBS

## 2019-12-19 LAB
GLUCOSE BLDC GLUCOMTR-MCNC: 123 MG/DL (ref 70–99)
GLUCOSE BLDC GLUCOMTR-MCNC: 123 MG/DL (ref 70–99)

## 2019-12-19 PROCEDURE — 97116 GAIT TRAINING THERAPY: CPT

## 2019-12-19 PROCEDURE — 97530 THERAPEUTIC ACTIVITIES: CPT

## 2019-12-19 PROCEDURE — 82962 GLUCOSE BLOOD TEST: CPT

## 2019-12-19 NOTE — PHYSICAL THERAPY NOTE
PHYSICAL THERAPY KNEE TREATMENT NOTE - INPATIENT     Room Number: 424/424-A             Presenting Problem: L total knee revision    Problem List  Principal Problem:    Failed total knee, left [facility])  Active Problems:    Essential hypertension    Mixed hype STATUS  Weight Bearing Restriction: L lower extremity           L Lower Extremity: Weight Bearing as Tolerated    PAIN ASSESSMENT   Ratin  Location: L knee  Management Techniques:  Activity promotion;Relaxation;Repositioning    BALANCE  Static Sitting: questions and concerns addressed; Ice applied; Family present    CURRENT GOALS  Goals to be met by: 2 wks  Patient Goal Patient's self-stated goal is: Return home   Goal #1 Patient is able to demonstrate supine - sit EOB @ level: modified independent     [place]

## 2019-12-19 NOTE — PLAN OF CARE
Medicated with scheduled and prn pain medicine. Up with one assist and walker. Daughter in room to assist with her care. Polar ice placed to help with swelling. Instructed on total knee replacement care. Call light and belongings in reach.  Plan is to patient/family  Outcome: Progressing  Goal: Maintain proper alignment of affected body part  Description  INTERVENTIONS:  - Support and protect limb and body alignment per provider's orders  - Instruct and reinforce with patient and family use of appropria Encourage toileting schedule  Outcome: Progressing     Problem: DISCHARGE PLANNING  Goal: Discharge to home or other facility with appropriate resources  Description  INTERVENTIONS:  - Identify barriers to discharge w/pt and caregiver  - Include patient/fa

## 2019-12-19 NOTE — PLAN OF CARE
Problem: RISK FOR INFECTION - ADULT  Goal: Absence of fever/infection during anticipated neutropenic period  Description  INTERVENTIONS  - Monitor WBC  - Administer growth factors as ordered  - Implement neutropenic guidelines  Outcome: Adequate for Disc Glucose as ordered  - Assess for signs and symptoms of hyperglycemia and hypoglycemia  - Administer ordered medications to maintain glucose within target range  - Assess barriers to adequate nutritional intake and initiate nutrition consult as needed  - In dislocation precautions)  Outcome: Adequate for Discharge   Ace wrap dressing removed and aquacel dressing in place with small amount of drainage noted. Pt and family instructed on dressing/incision care after discharge.  Pain well controlled with zev and p

## 2019-12-19 NOTE — PROGRESS NOTES
[facility]    Ortho Medical Progress Note     Patricia Galaviz Patient Status:  Inpatient    1950 MRN [id number]   Location [facility] 4W/SW/SE Attending Mohamud Washburn MD   Hosp Day # 2 PCP MD Sergey Kauffman  (L) 12/18/2019    K 4.2 12/18/2019    CL 96 (L) 12/18/2019    CO2 27.0 12/18/2019     (H) 12/18/2019    CA 8.2 (L) 12/18/2019    ALB 2.5 (L) 12/18/2019    ALKPHO 110 12/18/2019    BILT 0.2 12/18/2019    TP 5.8 (L) 12/18/2019    AST 17 12/18/2

## 2020-01-12 NOTE — OPERATIVE REPORT
[facility]    PATIENT'S NAME: Ok TEJADA   ATTENDING PHYSICIAN: Stephanie Valdez. MD Cuong   OPERATING PHYSICIAN: Stephanie Valdez.  MD Cuong   PATIENT ACCOUNT#:   [id number]    LOCATION:  4WSWSE [facility] #:   [id number]       DATE OF BIR infection. OPERATIVE TECHNIQUE:  The patient was admitted to the same-day surgery program on 12/17/2019.   Following proper identification in the preoperative holding area with confirmation of the above-stated procedure, the patient was brought to the ma this point decision was made to place a trabecular metal cone. A trabecular metal cone was fashioned and was placed and fully secured proximal to the capsular aspect of the tibia.   At this point a size 5 AP wedge tibial baseplate was placed, along with a that I utilized the assistance of an assistant, who was present and necessary for the case. He assisted with initial preoperative preparation, intraoperative assistance, final wound closure, as well as application of dressing and splint.     Dictated By Sc

## 2021-07-21 NOTE — PLAN OF CARE
Pt received from PACU. Left leg with ace bandage in place. Family at bedside. Medicated for pain with prn dilaudid and toradol. Pain well controlled. Pt up to the bathroom with walker with assistance. VSS. Neuro vascular checks good.   No acute distr affected body part  Description  INTERVENTIONS:  - Support and protect limb and body alignment per provider's orders  - Instruct and reinforce with patient and family use of appropriate assistive device and precautions (e.g. spinal or hip dislocation preca Tranexamic Acid Counseling:  Patient advised of the small risk of bleeding problems with tranexamic acid. They were also instructed to call if they developed any nausea, vomiting or diarrhea. All of the patient's questions and concerns were addressed.

## 2024-08-07 ENCOUNTER — APPOINTMENT (OUTPATIENT)
Dept: LAB | Age: 74
End: 2024-08-07
Attending: PSYCHIATRY & NEUROLOGY

## 2024-08-07 DIAGNOSIS — R41.3 DISTURBANCE OF MEMORY: ICD-10-CM

## 2024-08-07 DIAGNOSIS — R26.9 ABNORMALITY OF GAIT: Primary | ICD-10-CM

## 2024-08-07 DIAGNOSIS — M54.16 LUMBAR RADICULOPATHY: ICD-10-CM

## 2024-08-07 DIAGNOSIS — R52 GENERALIZED PAIN: ICD-10-CM

## 2024-08-07 LAB — FERRITIN SERPL-MCNC: 54 NG/ML (ref 8–252)

## 2024-08-07 PROCEDURE — 82607 VITAMIN B-12: CPT

## 2024-08-07 PROCEDURE — 82728 ASSAY OF FERRITIN: CPT

## 2024-08-07 PROCEDURE — 82746 ASSAY OF FOLIC ACID SERUM: CPT

## 2024-08-08 LAB
FOLATE SERPL-MCNC: >24 NG/ML
VIT B12 SERPL-MCNC: 505 PG/ML (ref 211–911)

## 2024-08-20 ENCOUNTER — APPOINTMENT (OUTPATIENT)
Dept: MRI IMAGING | Age: 74
End: 2024-08-20
Attending: PSYCHIATRY & NEUROLOGY

## 2024-10-26 ENCOUNTER — APPOINTMENT (OUTPATIENT)
Dept: GENERAL RADIOLOGY | Age: 74
End: 2024-10-26

## 2024-10-26 ENCOUNTER — HOSPITAL ENCOUNTER (EMERGENCY)
Age: 74
Discharge: HOME | End: 2024-10-27

## 2024-10-26 ENCOUNTER — APPOINTMENT (OUTPATIENT)
Dept: CT IMAGING | Age: 74
End: 2024-10-26

## 2024-10-26 DIAGNOSIS — R04.89: ICD-10-CM

## 2024-10-26 DIAGNOSIS — S40.012A CONTUSION OF LEFT SHOULDER, INITIAL ENCOUNTER: ICD-10-CM

## 2024-10-26 DIAGNOSIS — S22.32XA CLOSED FRACTURE OF ONE RIB OF LEFT SIDE, INITIAL ENCOUNTER: Primary | ICD-10-CM

## 2024-10-26 LAB
ALBUMIN SERPL-MCNC: 3.5 G/DL (ref 3.4–5)
ALBUMIN/GLOB SERPL: 1 {RATIO} (ref 1–2.4)
ALP SERPL-CCNC: 109 UNITS/L (ref 45–117)
ALT SERPL-CCNC: 23 UNITS/L
ANION GAP SERPL CALC-SCNC: 15 MMOL/L (ref 7–19)
AST SERPL-CCNC: 20 UNITS/L
BASOPHILS # BLD: 0 K/MCL (ref 0–0.3)
BASOPHILS NFR BLD: 0 %
BILIRUB SERPL-MCNC: 0.4 MG/DL (ref 0.2–1)
BUN SERPL-MCNC: 14 MG/DL (ref 6–20)
BUN/CREAT SERPL: 25 (ref 7–25)
CALCIUM SERPL-MCNC: 8.7 MG/DL (ref 8.4–10.2)
CHLORIDE SERPL-SCNC: 90 MMOL/L (ref 97–110)
CO2 SERPL-SCNC: 28 MMOL/L (ref 21–32)
CREAT SERPL-MCNC: 0.56 MG/DL (ref 0.51–0.95)
DEPRECATED RDW RBC: 38.2 FL (ref 39–50)
EGFRCR SERPLBLD CKD-EPI 2021: >90 ML/MIN/{1.73_M2}
EOSINOPHIL # BLD: 0.2 K/MCL (ref 0–0.5)
EOSINOPHIL NFR BLD: 2 %
ERYTHROCYTE [DISTWIDTH] IN BLOOD: 12.5 % (ref 11–15)
FASTING DURATION TIME PATIENT: ABNORMAL H
GLOBULIN SER-MCNC: 3.5 G/DL (ref 2–4)
GLUCOSE SERPL-MCNC: 106 MG/DL (ref 70–99)
HCT VFR BLD CALC: 39.1 % (ref 36–46.5)
HGB BLD-MCNC: 13 G/DL (ref 12–15.5)
IMM GRANULOCYTES # BLD AUTO: 0.1 K/MCL (ref 0–0.2)
IMM GRANULOCYTES # BLD: 1 %
LYMPHOCYTES # BLD: 2.3 K/MCL (ref 1–4)
LYMPHOCYTES NFR BLD: 31 %
MCH RBC QN AUTO: 27.7 PG (ref 26–34)
MCHC RBC AUTO-ENTMCNC: 33.2 G/DL (ref 32–36.5)
MCV RBC AUTO: 83.2 FL (ref 78–100)
MONOCYTES # BLD: 0.7 K/MCL (ref 0.3–0.9)
MONOCYTES NFR BLD: 9 %
NEUTROPHILS # BLD: 4.2 K/MCL (ref 1.8–7.7)
NEUTROPHILS NFR BLD: 57 %
NRBC BLD MANUAL-RTO: 0 /100 WBC
NT-PROBNP SERPL-MCNC: 239 PG/ML
PLATELET # BLD AUTO: 352 K/MCL (ref 140–450)
POTASSIUM SERPL-SCNC: 3.8 MMOL/L (ref 3.4–5.1)
PROT SERPL-MCNC: 7 G/DL (ref 6.4–8.2)
RBC # BLD: 4.7 MIL/MCL (ref 4–5.2)
SODIUM SERPL-SCNC: 129 MMOL/L (ref 135–145)
TROPONIN I SERPL DL<=0.01 NG/ML-MCNC: 11 NG/L
WBC # BLD: 7.4 K/MCL (ref 4.2–11)

## 2024-10-26 PROCEDURE — 71260 CT THORAX DX C+: CPT

## 2024-10-26 PROCEDURE — 93005 ELECTROCARDIOGRAM TRACING: CPT

## 2024-10-26 PROCEDURE — 99285 EMERGENCY DEPT VISIT HI MDM: CPT

## 2024-10-26 PROCEDURE — 83880 ASSAY OF NATRIURETIC PEPTIDE: CPT

## 2024-10-26 PROCEDURE — 84484 ASSAY OF TROPONIN QUANT: CPT

## 2024-10-26 PROCEDURE — 73030 X-RAY EXAM OF SHOULDER: CPT

## 2024-10-26 PROCEDURE — 85025 COMPLETE CBC W/AUTO DIFF WBC: CPT

## 2024-10-26 PROCEDURE — 10002803 HB RX 637

## 2024-10-26 PROCEDURE — 96374 THER/PROPH/DIAG INJ IV PUSH: CPT

## 2024-10-26 PROCEDURE — 71045 X-RAY EXAM CHEST 1 VIEW: CPT

## 2024-10-26 PROCEDURE — 80053 COMPREHEN METABOLIC PANEL: CPT

## 2024-10-26 RX ORDER — LIDOCAINE 4 G/G
1 PATCH TOPICAL ONCE
Status: DISCONTINUED | OUTPATIENT
Start: 2024-10-26 | End: 2024-10-27 | Stop reason: HOSPADM

## 2024-10-26 RX ORDER — LIDOCAINE 4 G/G
1 PATCH TOPICAL ONCE
Status: COMPLETED | OUTPATIENT
Start: 2024-10-26 | End: 2024-10-27

## 2024-10-26 RX ADMIN — LIDOCAINE 1 PATCH: 4 PATCH TOPICAL at 21:51

## 2024-10-26 RX ADMIN — LIDOCAINE 1 PATCH: 4 PATCH TOPICAL at 21:40

## 2024-10-26 RX ADMIN — LIDOCAINE 1 PATCH: 4 PATCH TOPICAL at 21:49

## 2024-10-26 ASSESSMENT — HEART SCORE
RISK FACTORS: 1-2 RISK FACTORS
EKG: NORMAL
AGE: EQUAL OR GREATER THAN 65
HEART SCORE: 3
TROPONIN: EQUAL OR LESS THAN NORMAL LIMIT
HISTORY: SLIGHTLY SUSPICIOUS

## 2024-10-26 ASSESSMENT — PAIN SCALES - GENERAL: PAINLEVEL_OUTOF10: 1

## 2024-10-27 VITALS
DIASTOLIC BLOOD PRESSURE: 66 MMHG | HEART RATE: 71 BPM | TEMPERATURE: 97.3 F | HEIGHT: 64 IN | OXYGEN SATURATION: 96 % | RESPIRATION RATE: 16 BRPM | WEIGHT: 187.39 LBS | SYSTOLIC BLOOD PRESSURE: 138 MMHG | BODY MASS INDEX: 31.99 KG/M2

## 2024-10-27 PROCEDURE — 10002800 HB RX 250 W HCPCS

## 2024-10-27 PROCEDURE — 10002805 HB CONTRAST AGENT

## 2024-10-27 RX ORDER — TRAMADOL HYDROCHLORIDE 50 MG/1
50 TABLET ORAL EVERY 8 HOURS PRN
Qty: 10 TABLET | Refills: 0 | Status: SHIPPED | OUTPATIENT
Start: 2024-10-27

## 2024-10-27 RX ORDER — LIDOCAINE 50 MG/G
1 PATCH TOPICAL EVERY 24 HOURS
Qty: 30 PATCH | Refills: 0 | Status: SHIPPED | OUTPATIENT
Start: 2024-10-27

## 2024-10-27 RX ORDER — LIDOCAINE 50 MG/G
1 PATCH TOPICAL EVERY 24 HOURS
Qty: 30 PATCH | Refills: 0 | Status: SHIPPED | OUTPATIENT
Start: 2024-10-27 | End: 2024-10-27

## 2024-10-27 RX ORDER — TRAMADOL HYDROCHLORIDE 50 MG/1
50 TABLET ORAL EVERY 8 HOURS PRN
Qty: 10 TABLET | Refills: 0 | Status: SHIPPED | OUTPATIENT
Start: 2024-10-27 | End: 2024-10-27

## 2024-10-27 RX ORDER — KETOROLAC TROMETHAMINE 15 MG/ML
15 INJECTION, SOLUTION INTRAMUSCULAR; INTRAVENOUS ONCE
Status: COMPLETED | OUTPATIENT
Start: 2024-10-27 | End: 2024-10-27

## 2024-10-27 RX ADMIN — IOHEXOL 75 ML: 350 INJECTION, SOLUTION INTRAVENOUS at 01:19

## 2024-10-27 RX ADMIN — KETOROLAC TROMETHAMINE 15 MG: 15 INJECTION, SOLUTION INTRAMUSCULAR; INTRAVENOUS at 01:47

## 2024-10-27 ASSESSMENT — PAIN SCALES - GENERAL: PAINLEVEL_OUTOF10: 6

## 2024-10-28 LAB
ATRIAL RATE (BPM): 71
P AXIS (DEGREES): 43
PR-INTERVAL (MSEC): 138
QRS-INTERVAL (MSEC): 84
QT-INTERVAL (MSEC): 376
QTC: 409
R AXIS (DEGREES): 40
REPORT TEXT: NORMAL
T AXIS (DEGREES): 59
VENTRICULAR RATE EKG/MIN (BPM): 71

## 2024-11-04 ENCOUNTER — TELEPHONE (OUTPATIENT)
Dept: EMERGENCY MEDICINE | Age: 74
End: 2024-11-04

## 2025-04-07 ENCOUNTER — TELEPHONE (OUTPATIENT)
Dept: ORTHOPEDICS CLINIC | Facility: CLINIC | Age: 75
End: 2025-04-07

## 2025-04-07 NOTE — TELEPHONE ENCOUNTER
Patient would like to schedule with Dr Rea at [facility] for a 2nd opinion. Patient will get imaging disc. Please advise

## 2025-04-07 NOTE — TELEPHONE ENCOUNTER
Called patient. No answer. Unable to LVM. Voicemail box full.       No MRI or CT spine in chart.

## (undated) DEVICE — POLAR CARE CUBE COOLING UNIT

## (undated) DEVICE — DRESSING AQUACEL AG SP 3.5X10

## (undated) DEVICE — 7.5MM ACORN

## (undated) DEVICE — COTTON UNDERCAST PADDING,REGULAR FINISH: Brand: WEBRIL

## (undated) DEVICE — Device

## (undated) DEVICE — NEEDLE HPO 18GA 1.5IN ECLPS

## (undated) DEVICE — SOL  .9 1000ML BTL

## (undated) DEVICE — T5 HOOD WITH PEEL AWAY FACE SHIELD

## (undated) DEVICE — PAD THRP 16IN WRPON MU LNG STM

## (undated) DEVICE — SYRINGE MNJCT 10ML LF STRL REG

## (undated) DEVICE — DRESSING AQUACEL AG SP 3.5X14

## (undated) DEVICE — Device: Brand: JELCO

## (undated) DEVICE — Device: Brand: STABLECUT®

## (undated) DEVICE — GAMMEX® PI HYBRID SIZE 7.5, STERILE POWDER-FREE SURGICAL GLOVE, POLYISOPRENE AND NEOPRENE BLEND: Brand: GAMMEX

## (undated) DEVICE — BATTERY

## (undated) DEVICE — STANDARD HYPODERMIC NEEDLE,POLYPROPYLENE HUB: Brand: MONOJECT

## (undated) DEVICE — 33MM MG 2.5 HEX HEAD PIN-DJ

## (undated) DEVICE — WRENCH ZIM BREAKAWAY 5987-71

## (undated) DEVICE — 20 ML SYRINGE LUER-LOCK TIP: Brand: MONOJECT

## (undated) DEVICE — TOURNIQUET CUFF 34 STR

## (undated) DEVICE — SOL  .9 3000ML

## (undated) DEVICE — CHLORAPREP 26ML APPLICATOR

## (undated) DEVICE — 60 ML SYRINGE,TOOMEY TYPE: Brand: MONOJECT

## (undated) DEVICE — DRAPE SRG 70X60IN SPLT U IMPRV

## (undated) DEVICE — 3M™ IOBAN™ 2 ANTIMICROBIAL INCISE DRAPE 6650EZ: Brand: IOBAN™ 2

## (undated) DEVICE — TOTAL KNEE: Brand: MEDLINE INDUSTRIES, INC.

## (undated) DEVICE — SPINOCAN® 18 GA. X 3-1/2 IN. (90 MM) SPINAL NEEDLE: Brand: SPINOCAN®

## (undated) DEVICE — 50MM MG 2.5 HEX HEAD PIN-DJ

## (undated) DEVICE — ANTIBACTERIAL VIOLET BRAIDED (POLYGLACTIN 910), SYNTHETIC ABSORBABLE SUTURE: Brand: COATED VICRYL

## (undated) DEVICE — GAUZE SPONGES,12 PLY: Brand: CURITY

## (undated) DEVICE — BOWL CEMENT MIX QUICK-VAC

## (undated) DEVICE — CONTAINER SPEC STR 4OZ GRY LID

## (undated) DEVICE — 60 ML SYRINGE LUER-LOCK TIP: Brand: MONOJECT

## (undated) DEVICE — GAMMEX® PI HYBRID SIZE 8, STERILE POWDER-FREE SURGICAL GLOVE, POLYISOPRENE AND NEOPRENE BLEND: Brand: GAMMEX

## (undated) DEVICE — DRAPE SHEET LG